# Patient Record
Sex: FEMALE | Race: BLACK OR AFRICAN AMERICAN | NOT HISPANIC OR LATINO | Employment: FULL TIME | URBAN - METROPOLITAN AREA
[De-identification: names, ages, dates, MRNs, and addresses within clinical notes are randomized per-mention and may not be internally consistent; named-entity substitution may affect disease eponyms.]

---

## 2024-01-21 ENCOUNTER — APPOINTMENT (OUTPATIENT)
Dept: RADIOLOGY | Facility: HOSPITAL | Age: 34
End: 2024-01-21
Payer: COMMERCIAL

## 2024-01-21 ENCOUNTER — HOSPITAL ENCOUNTER (EMERGENCY)
Facility: HOSPITAL | Age: 34
Discharge: HOME | End: 2024-01-21
Payer: COMMERCIAL

## 2024-01-21 VITALS
OXYGEN SATURATION: 99 % | RESPIRATION RATE: 16 BRPM | HEART RATE: 96 BPM | SYSTOLIC BLOOD PRESSURE: 132 MMHG | BODY MASS INDEX: 38.51 KG/M2 | DIASTOLIC BLOOD PRESSURE: 86 MMHG | HEIGHT: 69 IN | TEMPERATURE: 98.6 F | WEIGHT: 260 LBS

## 2024-01-21 DIAGNOSIS — S06.0X0A CONCUSSION WITHOUT LOSS OF CONSCIOUSNESS, INITIAL ENCOUNTER: Primary | ICD-10-CM
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
## 2024-01-21 PROCEDURE — 99284 EMERGENCY DEPT VISIT MOD MDM: CPT | Performed by: PHYSICIAN ASSISTANT

## 2024-01-21 PROCEDURE — 99284 EMERGENCY DEPT VISIT MOD MDM: CPT

## 2024-01-21 PROCEDURE — 70450 CT HEAD/BRAIN W/O DYE: CPT | Performed by: STUDENT IN AN ORGANIZED HEALTH CARE EDUCATION/TRAINING PROGRAM

## 2024-01-21 PROCEDURE — 70450 CT HEAD/BRAIN W/O DYE: CPT

## 2024-01-21 PROCEDURE — 2500000005 HC RX 250 GENERAL PHARMACY W/O HCPCS: Performed by: PHYSICIAN ASSISTANT

## 2024-01-21 RX ORDER — ONDANSETRON 4 MG/1
4 TABLET, ORALLY DISINTEGRATING ORAL ONCE
Status: COMPLETED | OUTPATIENT
Start: 2024-01-21 | End: 2024-01-21

## 2024-01-21 RX ORDER — METHOCARBAMOL 750 MG/1
1500 TABLET, FILM COATED ORAL 3 TIMES DAILY PRN
Qty: 21 TABLET | Refills: 0 | Status: SHIPPED | OUTPATIENT
Start: 2024-01-21

## 2024-01-21 RX ORDER — ONDANSETRON 4 MG/1
4 TABLET, ORALLY DISINTEGRATING ORAL EVERY 8 HOURS PRN
Qty: 20 TABLET | Refills: 0 | Status: SHIPPED | OUTPATIENT
Start: 2024-01-21 | End: 2024-01-28

## 2024-01-21 RX ORDER — ACETAMINOPHEN 325 MG/1
975 TABLET ORAL ONCE
Status: COMPLETED | OUTPATIENT
Start: 2024-01-21 | End: 2024-01-21

## 2024-01-21 RX ORDER — ACETAMINOPHEN 500 MG
500 TABLET ORAL EVERY 6 HOURS PRN
Qty: 30 TABLET | Refills: 0 | Status: SHIPPED | OUTPATIENT
Start: 2024-01-21

## 2024-01-21 RX ORDER — IBUPROFEN 600 MG/1
600 TABLET ORAL EVERY 6 HOURS PRN
Qty: 30 TABLET | Refills: 0 | Status: SHIPPED | OUTPATIENT
Start: 2024-01-21

## 2024-01-21 RX ADMIN — ONDANSETRON 4 MG: 4 TABLET, ORALLY DISINTEGRATING ORAL at 17:50

## 2024-01-21 RX ADMIN — ACETAMINOPHEN 975 MG: 325 TABLET ORAL at 17:50

## 2024-01-21 ASSESSMENT — COLUMBIA-SUICIDE SEVERITY RATING SCALE - C-SSRS
2. HAVE YOU ACTUALLY HAD ANY THOUGHTS OF KILLING YOURSELF?: NO
1. IN THE PAST MONTH, HAVE YOU WISHED YOU WERE DEAD OR WISHED YOU COULD GO TO SLEEP AND NOT WAKE UP?: NO
6. HAVE YOU EVER DONE ANYTHING, STARTED TO DO ANYTHING, OR PREPARED TO DO ANYTHING TO END YOUR LIFE?: NO
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
## 2024-01-21 NOTE — DISCHARGE INSTRUCTIONS
Please follow-up with the concussion clinic.  You should hear from them in the next 2 days.  If you do not get a call, please call [phone] to schedule the appointment.

## 2024-06-27 ENCOUNTER — HOSPITAL ENCOUNTER (EMERGENCY)
Facility: HOSPITAL | Age: 34
Discharge: HOME | End: 2024-06-27
Payer: COMMERCIAL

## 2024-06-27 VITALS
BODY MASS INDEX: 40.01 KG/M2 | OXYGEN SATURATION: 100 % | TEMPERATURE: 97.2 F | HEART RATE: 84 BPM | SYSTOLIC BLOOD PRESSURE: 133 MMHG | DIASTOLIC BLOOD PRESSURE: 82 MMHG | HEIGHT: 68 IN | RESPIRATION RATE: 16 BRPM | WEIGHT: 264 LBS

## 2024-06-27 DIAGNOSIS — N93.9 ABNORMAL UTERINE BLEEDING (AUB): Primary | ICD-10-CM

## 2024-06-27 LAB
ABO GROUP (TYPE) IN BLOOD: NORMAL
ANION GAP SERPL CALC-SCNC: 12 MMOL/L (ref 10–20)
ANTIBODY SCREEN: NORMAL
APPEARANCE UR: CLEAR
BASOPHILS # BLD AUTO: 0.08 X10*3/UL (ref 0–0.1)
BASOPHILS NFR BLD AUTO: 1.1 %
BILIRUB UR STRIP.AUTO-MCNC: NEGATIVE MG/DL
BUN SERPL-MCNC: 9 MG/DL (ref 6–23)
CALCIUM SERPL-MCNC: 9.1 MG/DL (ref 8.6–10.6)
CHLORIDE SERPL-SCNC: 102 MMOL/L (ref 98–107)
CO2 SERPL-SCNC: 26 MMOL/L (ref 21–32)
COLOR UR: COLORLESS
CREAT SERPL-MCNC: 0.72 MG/DL (ref 0.5–1.05)
EGFRCR SERPLBLD CKD-EPI 2021: >90 ML/MIN/1.73M*2
EOSINOPHIL # BLD AUTO: 0.06 X10*3/UL (ref 0–0.7)
EOSINOPHIL NFR BLD AUTO: 0.8 %
ERYTHROCYTE [DISTWIDTH] IN BLOOD BY AUTOMATED COUNT: 15.2 % (ref 11.5–14.5)
GLUCOSE SERPL-MCNC: 74 MG/DL (ref 74–99)
GLUCOSE UR STRIP.AUTO-MCNC: NORMAL MG/DL
HCT VFR BLD AUTO: 36.6 % (ref 36–46)
HGB BLD-MCNC: 11.9 G/DL (ref 12–16)
IMM GRANULOCYTES # BLD AUTO: 0.01 X10*3/UL (ref 0–0.7)
IMM GRANULOCYTES NFR BLD AUTO: 0.1 % (ref 0–0.9)
INR PPP: 1 (ref 0.9–1.1)
KETONES UR STRIP.AUTO-MCNC: NEGATIVE MG/DL
LEUKOCYTE ESTERASE UR QL STRIP.AUTO: NEGATIVE
LYMPHOCYTES # BLD AUTO: 2.6 X10*3/UL (ref 1.2–4.8)
LYMPHOCYTES NFR BLD AUTO: 35.3 %
MCH RBC QN AUTO: 27.4 PG (ref 26–34)
MCHC RBC AUTO-ENTMCNC: 32.5 G/DL (ref 32–36)
MCV RBC AUTO: 84 FL (ref 80–100)
MONOCYTES # BLD AUTO: 0.33 X10*3/UL (ref 0.1–1)
MONOCYTES NFR BLD AUTO: 4.5 %
NEUTROPHILS # BLD AUTO: 4.29 X10*3/UL (ref 1.2–7.7)
NEUTROPHILS NFR BLD AUTO: 58.2 %
NITRITE UR QL STRIP.AUTO: NEGATIVE
NRBC BLD-RTO: 0 /100 WBCS (ref 0–0)
PH UR STRIP.AUTO: 6.5 [PH]
PLATELET # BLD AUTO: 480 X10*3/UL (ref 150–450)
POTASSIUM SERPL-SCNC: 3.6 MMOL/L (ref 3.5–5.3)
PREGNANCY TEST URINE, POC: NEGATIVE
PROT UR STRIP.AUTO-MCNC: NEGATIVE MG/DL
PROTHROMBIN TIME: 11.6 SECONDS (ref 9.8–12.8)
RBC # BLD AUTO: 4.34 X10*6/UL (ref 4–5.2)
RBC # UR STRIP.AUTO: ABNORMAL /UL
RBC #/AREA URNS AUTO: NORMAL /HPF
RH FACTOR (ANTIGEN D): NORMAL
SODIUM SERPL-SCNC: 136 MMOL/L (ref 136–145)
SP GR UR STRIP.AUTO: 1
SQUAMOUS #/AREA URNS AUTO: NORMAL /HPF
UROBILINOGEN UR STRIP.AUTO-MCNC: NORMAL MG/DL
WBC # BLD AUTO: 7.4 X10*3/UL (ref 4.4–11.3)
WBC #/AREA URNS AUTO: NORMAL /HPF

## 2024-06-27 PROCEDURE — 85610 PROTHROMBIN TIME: CPT | Performed by: PHYSICIAN ASSISTANT

## 2024-06-27 PROCEDURE — 81025 URINE PREGNANCY TEST: CPT | Performed by: PHYSICIAN ASSISTANT

## 2024-06-27 PROCEDURE — 99283 EMERGENCY DEPT VISIT LOW MDM: CPT

## 2024-06-27 PROCEDURE — 36415 COLL VENOUS BLD VENIPUNCTURE: CPT | Performed by: PHYSICIAN ASSISTANT

## 2024-06-27 PROCEDURE — 99284 EMERGENCY DEPT VISIT MOD MDM: CPT | Performed by: PHYSICIAN ASSISTANT

## 2024-06-27 PROCEDURE — 81001 URINALYSIS AUTO W/SCOPE: CPT | Performed by: PHYSICIAN ASSISTANT

## 2024-06-27 PROCEDURE — 86850 RBC ANTIBODY SCREEN: CPT | Performed by: PHYSICIAN ASSISTANT

## 2024-06-27 PROCEDURE — 85025 COMPLETE CBC W/AUTO DIFF WBC: CPT | Performed by: PHYSICIAN ASSISTANT

## 2024-06-27 PROCEDURE — 80048 BASIC METABOLIC PNL TOTAL CA: CPT | Performed by: PHYSICIAN ASSISTANT

## 2024-06-27 PROCEDURE — 86901 BLOOD TYPING SEROLOGIC RH(D): CPT | Performed by: PHYSICIAN ASSISTANT

## 2024-06-27 RX ORDER — DROSPIRENONE AND ETHINYL ESTRADIOL 0.03MG-3MG
1 KIT ORAL DAILY
Qty: 28 TABLET | Refills: 0 | Status: SHIPPED | OUTPATIENT
Start: 2024-06-27 | End: 2025-06-27

## 2024-06-27 ASSESSMENT — LIFESTYLE VARIABLES
HAVE YOU EVER FELT YOU SHOULD CUT DOWN ON YOUR DRINKING: NO
TOTAL SCORE: 0
EVER FELT BAD OR GUILTY ABOUT YOUR DRINKING: NO
EVER HAD A DRINK FIRST THING IN THE MORNING TO STEADY YOUR NERVES TO GET RID OF A HANGOVER: NO
HAVE PEOPLE ANNOYED YOU BY CRITICIZING YOUR DRINKING: NO

## 2024-06-27 ASSESSMENT — COLUMBIA-SUICIDE SEVERITY RATING SCALE - C-SSRS
6. HAVE YOU EVER DONE ANYTHING, STARTED TO DO ANYTHING, OR PREPARED TO DO ANYTHING TO END YOUR LIFE?: NO
1. IN THE PAST MONTH, HAVE YOU WISHED YOU WERE DEAD OR WISHED YOU COULD GO TO SLEEP AND NOT WAKE UP?: NO
2. HAVE YOU ACTUALLY HAD ANY THOUGHTS OF KILLING YOURSELF?: NO

## 2024-06-27 ASSESSMENT — PAIN DESCRIPTION - LOCATION: LOCATION: PELVIS

## 2024-06-27 ASSESSMENT — PAIN SCALES - GENERAL: PAINLEVEL_OUTOF10: 5 - MODERATE PAIN

## 2024-06-27 ASSESSMENT — PAIN - FUNCTIONAL ASSESSMENT: PAIN_FUNCTIONAL_ASSESSMENT: 0-10

## 2024-06-27 NOTE — DISCHARGE INSTRUCTIONS
Start the birth control pill and take it once daily as instructed in the packet.  A referral is entered to follow-up with OB/GYN, if you have not heard from them call [phone].  Return for any hemorrhaging including soaking pads every 1/2 hour for 3 hours straight or passing out

## 2024-06-27 NOTE — ED PROVIDER NOTES
"HPI:  34-year-old female with history of bipolar disorder, anxiety presents for vaginal bleeding.  States that her cycle started scheduled beginning of June however has persisted since then.  States she is bleeding daily now for over 3 weeks.  States she is soaking through tampons changing over 10 a day.  States that it is not increasing however not improving.  Is noticed nickel size clots.  States she feels mildly lightheaded.  Denies any palpitations syncope or near syncope.  Has not been to OB in \"quite a while\", is supposed to go in July however no appointment active in EMR.  Denies any abdominal pain, states she is not sexually active, is not concerned for STDs.    Physical Exam:   GEN: Vitals noted. NAD  EYES:  EOMs grossly intact, anicteric sclera  ANTONIO: Mucosa moist.  NECK: Supple.  CARD: RRR  PULMONARY: Moving air well. Clear all lung fields.  ABDOMEN: Soft, no guarding, no rigidity. Nontender. NABS  EXTREMITIES: Full ROM, no pitting edema,   SKIN: Intact, warm and dry  NEURO: Alert and oriented x 3, speech is clear, no obvious deficits noted.   : With female nurse as chaperone and assistant; Speculum exam shows no discharge, ulcerations, lesions.  Mild amount of dark red blood in the vaginal vault with small clots present.  Closed cervical os. Bimanual exam shows no adnexal tenderness or mass. No cervical motion tenderness.    ----------------------------------------------------------------------------------------------------------------------------    MDM:  34-year-old female presenting for vaginal bleeding.  On exam she is well-appearing ambulating comfortably.  Vital signs stable.  ABD soft NTTP with NABS.  Pelvic exam performed with female nurse chaperone showed mild amount of dark red blood in the vaginal with small clots present, Clos cervical os, no adnexal or cervical motion tenderness.  Will check laboratory work to evaluate for anemia.  I discussed the option of starting OCPs until she is able " to follow-up with OB/GYN.  She is amenable to this plan.    ED Course as of 06/27/24 1857   Thu Jun 27, 2024 1810 Preg Test, Ur: Negative [ELENA]   1810 WBC, Urine: NONE [ELENA]   1855 HEMOGLOBIN(!): 11.9  Reassuring with no significant anemia [ELENA]   1856 Results were discussed with the patient including discharge plan.  Plan will be to start new OCP, prescription sent to her pharmacy.  She is to follow-up with OB/GYN.  Referral is entered to try to see them soon.  She is provided the phone number.  She is to return for any heavy bleeding including soaking a pad every 1/2 hour for 3 hours straight or signs or symptoms of acute anemia.  She verbalized understanding discharge plan she agreed with plan all questions were answered. [ELENA]      ED Course User Index  [ELENA] Jonah Mcclendon PA-C         Diagnoses as of 06/27/24 1857   Abnormal uterine bleeding (AUB)     No orders to display     Labs Reviewed   CBC WITH AUTO DIFFERENTIAL - Abnormal       Result Value    WBC 7.4      nRBC 0.0      RBC 4.34      Hemoglobin 11.9 (*)     Hematocrit 36.6      MCV 84      MCH 27.4      MCHC 32.5      RDW 15.2 (*)     Platelets 480 (*)     Neutrophils % 58.2      Immature Granulocytes %, Automated 0.1      Lymphocytes % 35.3      Monocytes % 4.5      Eosinophils % 0.8      Basophils % 1.1      Neutrophils Absolute 4.29      Immature Granulocytes Absolute, Automated 0.01      Lymphocytes Absolute 2.60      Monocytes Absolute 0.33      Eosinophils Absolute 0.06      Basophils Absolute 0.08     URINALYSIS WITH REFLEX CULTURE AND MICROSCOPIC - Abnormal    Color, Urine Colorless (*)     Appearance, Urine Clear      Specific Gravity, Urine 1.004 (*)     pH, Urine 6.5      Protein, Urine NEGATIVE      Glucose, Urine Normal      Blood, Urine 0.2 (2+) (*)     Ketones, Urine NEGATIVE      Bilirubin, Urine NEGATIVE      Urobilinogen, Urine Normal      Nitrite, Urine NEGATIVE      Leukocyte Esterase, Urine NEGATIVE     BASIC METABOLIC PANEL -  Normal    Glucose 74      Sodium 136      Potassium 3.6      Chloride 102      Bicarbonate 26      Anion Gap 12      Urea Nitrogen 9      Creatinine 0.72      eGFR >90      Calcium 9.1     PROTIME-INR - Normal    Protime 11.6      INR 1.0     POCT PREGNANCY, URINE - Normal    Preg Test, Ur Negative     TYPE AND SCREEN   URINALYSIS WITH REFLEX CULTURE AND MICROSCOPIC    Narrative:     The following orders were created for panel order Urinalysis with Reflex Culture and Microscopic.  Procedure                               Abnormality         Status                     ---------                               -----------         ------                     Urinalysis with Reflex C...[[id number]]  Abnormal            Final result               Extra Urine Gray Tube[[id number]]                            In process                   Please view results for these tests on the individual orders.   EXTRA URINE GRAY TUBE   URINALYSIS MICROSCOPIC WITH REFLEX CULTURE    WBC, Urine NONE      RBC, Urine 1-2      Squamous Epithelial Cells, Urine 1-9 (SPARSE)         ----------------------------------------------------------------------------------------------------------------------------    This note was dictated using a speech recognition program.  While an attempt was made at proof reading to minimize errors, minor errors in transcription may be present call for questions.     Jonah Mcclendon PA-C  06/27/24 4570

## 2024-06-27 NOTE — ED TRIAGE NOTES
PT started cycle on 6/5 still having bleeding, having some lightheadedness and decreased appetite

## 2024-06-28 ENCOUNTER — TELEPHONE (OUTPATIENT)
Dept: OBSTETRICS AND GYNECOLOGY | Facility: HOSPITAL | Age: 34
End: 2024-06-28
Payer: COMMERCIAL

## 2024-06-28 DIAGNOSIS — N93.8 DUB (DYSFUNCTIONAL UTERINE BLEEDING): Primary | ICD-10-CM

## 2024-06-28 LAB — HOLD SPECIMEN: NORMAL

## 2024-06-28 NOTE — TELEPHONE ENCOUNTER
Per Dr Hawk, pls pend the gyn ultrasound order to him and he will sign. Pt will need to have done today for results to be reviewed at her appt Tues so depending if she can get it done today or not, she may need rescheduled for later in the week w/ Carine    Thank you   Trisha Milan

## 2024-06-28 NOTE — TELEPHONE ENCOUNTER
Patient has a STAT referral placed in for abnormal heavy bleeding since 6/5. Sh was place on Tuesdays scheduled. No Ultrasound was done on patient in ED.  Would want patient to go for Ultrasound before appointment?   Please Advise

## 2024-07-01 ENCOUNTER — HOSPITAL ENCOUNTER (OUTPATIENT)
Dept: RADIOLOGY | Facility: HOSPITAL | Age: 34
Discharge: HOME | End: 2024-07-01
Payer: COMMERCIAL

## 2024-07-01 DIAGNOSIS — N93.8 DUB (DYSFUNCTIONAL UTERINE BLEEDING): ICD-10-CM

## 2024-07-01 PROCEDURE — 76830 TRANSVAGINAL US NON-OB: CPT | Performed by: RADIOLOGY

## 2024-07-01 PROCEDURE — 76856 US EXAM PELVIC COMPLETE: CPT | Performed by: RADIOLOGY

## 2024-07-01 PROCEDURE — 76856 US EXAM PELVIC COMPLETE: CPT

## 2024-07-02 ENCOUNTER — APPOINTMENT (OUTPATIENT)
Dept: OBSTETRICS AND GYNECOLOGY | Facility: CLINIC | Age: 34
End: 2024-07-02
Payer: COMMERCIAL

## 2024-07-02 VITALS
BODY MASS INDEX: 40.16 KG/M2 | WEIGHT: 265 LBS | DIASTOLIC BLOOD PRESSURE: 84 MMHG | SYSTOLIC BLOOD PRESSURE: 118 MMHG | HEIGHT: 68 IN

## 2024-07-02 DIAGNOSIS — N93.8 DUB (DYSFUNCTIONAL UTERINE BLEEDING): Primary | ICD-10-CM

## 2024-07-02 PROCEDURE — 99214 OFFICE O/P EST MOD 30 MIN: CPT | Performed by: OBSTETRICS & GYNECOLOGY

## 2024-07-02 PROCEDURE — 1036F TOBACCO NON-USER: CPT | Performed by: OBSTETRICS & GYNECOLOGY

## 2024-07-02 RX ORDER — LAMOTRIGINE 200 MG/1
200 TABLET ORAL
COMMUNITY
Start: 2024-05-29

## 2024-07-02 RX ORDER — MEGESTROL ACETATE 20 MG/1
20 TABLET ORAL DAILY
Qty: 30 TABLET | Refills: 0 | Status: SHIPPED | OUTPATIENT
Start: 2024-07-02 | End: 2024-08-01

## 2024-07-02 NOTE — LETTER
July 2, 2024     Jonah Mcclendon PA-C  [id number]  Department Of Emergency Medicine  [facility] [address]    Patient: Chante Del Cid   YOB: 1990   Date of Visit: 7/2/2024       Dear Dr. Jonah Mcclendon PA-C:    Thank you for referring Chante Del Cid to me for evaluation. Below are my notes for this consultation.  If you have questions, please do not hesitate to call me. I look forward to following your patient along with you.       Sincerely,     Dawson Hawk, DO      CC: Andie Reis, DO  ______________________________________________________________________________________    qChante Del Cid is a 34 y.o. female who presents with a chief complaint of Follow-up (Referred for irreg vaginal bleeding, patient had ultrasound done 07/01/2024)      SUBJECTIVE  Patient presents as referral from Dr. Mcclendon for dysfunctional bleeding.  She states she is bleeding about 3 weeks continuously.  She was started on birth control which has not stopped her bleeding.  She had an ultrasound that was normal.  She never had this problem before and nothing seems to be making it any better.  We discussed this at length and some other birth control options    Past Medical History:   Diagnosis Date   • Anxiety    • Bipolar 2 disorder (Multi)    • Other specified health status     No pertinent past medical history     Past Surgical History:   Procedure Laterality Date   • OTHER SURGICAL HISTORY  10/26/2016    Prior Surgical Procedure Not Done     Social History     Socioeconomic History   • Marital status: Single     Spouse name: None   • Number of children: None   • Years of education: None   • Highest education level: None   Occupational History   • None   Tobacco Use   • Smoking status: Never   • Smokeless tobacco: Never   Vaping Use   • Vaping status: Never Used   Substance and Sexual Activity   • Alcohol use: Not Currently   • Drug use: Never   • Sexual activity: Not Currently   Other Topics Concern    • None   Social History Narrative   • None     Social Determinants of Health     Financial Resource Strain: Not at Risk (2023)    Received from [employer]     Financial Resource Strain    • Financial Resource Strain: 1   Food Insecurity: Not at Risk (2023)    Received from [employer]     Food Insecurity    • Food: 1   Transportation Needs: Not at Risk (2023)    Received from [employer]     Transportation Needs    • Transportation: 1   Physical Activity: At Risk (2023)    Received from [employer]     Physical Activity    • Physical Activity: 2   Stress: Not at Risk (2023)    Received from [facility]     Stress    • Stress: 1   Social Connections: Not at Risk (2023)    Received from [employer]     Social Connections    • Social Connections and Isolation: 1   Intimate Partner Violence: Not on file   Housing Stability: Not at Risk (2023)    Received from [employer]     Housing Stability    • Housin     No family history on file.    OB History    Para Term  AB Living   1 1 1 0 0 1   SAB IAB Ectopic Multiple Live Births   0 0 0 0 1      # Outcome Date GA Lbr Bhaskar/2nd Weight Sex Delivery Anes PTL Lv   1 Term  40w0d  3.771 kg F Vag-Spont EPI  CRISTOBAL       OBJECTIVE  Allergies   Allergen Reactions   • Bee Venom Protein (Honey Bee) Hives and Swelling      (Not in a hospital admission)       Review of Systems  History obtained from the patient  General ROS: negative  Psychological ROS: negative  Gastrointestinal ROS: negative  Musculoskeletal ROS: negative  Physical Exam  General Appearance: awake, alert, oriented, in no acute distress, well developed, well nourished, and in no acute distress  Skin: there are no suspicious lesions or rashes of concern, skin color, texture, turgor are normal; there are no bruises, rashes or lesions.  Head/Face: NCAT  Eyes: No gross abnormalities., PERRL, and EOMI  Neck: neck- supple, no mass, non-tender  Back: no pain to palpation  Abdomen: Soft, non-tender, normal bowel sounds;  "no bruits, organomegaly or masses.  Extremities: Extremities warm to touch, pink, with no edema.  Musculoskeletal: negative    /84   Ht 1.727 m (5' 8\")   Wt 120 kg (265 lb)   LMP 06/05/2024 (Exact Date) Comment: current  BMI 40.29 kg/m²    Problem List Items Addressed This Visit    None  Visit Diagnoses       DUB (dysfunctional uterine bleeding)    -  Primary    Relevant Medications    megestrol (Megace) 20 mg tablet         Follow up 2 weeks for Mirena iud   Given pt handout      "

## 2024-07-02 NOTE — PROGRESS NOTES
Briseida Del Cid is a 34 y.o. female who presents with a chief complaint of Follow-up (Referred for irreg vaginal bleeding, patient had ultrasound done 07/01/2024)      SUBJECTIVE  Patient presents as referral from Dr. Mcclendon for dysfunctional bleeding.  She states she is bleeding about 3 weeks continuously.  She was started on birth control which has not stopped her bleeding.  She had an ultrasound that was normal.  She never had this problem before and nothing seems to be making it any better.  We discussed this at length and some other birth control options    Past Medical History:   Diagnosis Date    Anxiety     Bipolar 2 disorder (Multi)     Other specified health status     No pertinent past medical history     Past Surgical History:   Procedure Laterality Date    OTHER SURGICAL HISTORY  10/26/2016    Prior Surgical Procedure Not Done     Social History     Socioeconomic History    Marital status: Single     Spouse name: None    Number of children: None    Years of education: None    Highest education level: None   Occupational History    None   Tobacco Use    Smoking status: Never    Smokeless tobacco: Never   Vaping Use    Vaping status: Never Used   Substance and Sexual Activity    Alcohol use: Not Currently    Drug use: Never    Sexual activity: Not Currently   Other Topics Concern    None   Social History Narrative    None     Social Determinants of Health     Financial Resource Strain: Not at Risk (2/22/2023)    Received from [employer]     Financial Resource Strain     Financial Resource Strain: 1   Food Insecurity: Not at Risk (2/22/2023)    Received from [facility]     Food Insecurity     Food: 1   Transportation Needs: Not at Risk (2/22/2023)    Received from [facility]     Transportation Needs     Transportation: 1   Physical Activity: At Risk (2/22/2023)    Received from [facility]     Physical Activity     Physical Activity: 2   Stress: Not at Risk (2/22/2023)    Received from [facility]     Stress     Stress: 1  "  Social Connections: Not at Risk (2023)    Received from [employer] [facility]     Social Connections and Isolation: 1   Intimate Partner Violence: Not on file   Housing Stability: Not at Risk (2023)    Received from [employer]     Housing Stability     Housin     No family history on file.    OB History    Para Term  AB Living   1 1 1 0 0 1   SAB IAB Ectopic Multiple Live Births   0 0 0 0 1      # Outcome Date GA Lbr Bhaskar/2nd Weight Sex Delivery Anes PTL Lv   1 Term  40w0d  3.771 kg F Vag-Spont EPI  CRISTOBAL       OBJECTIVE  Allergies   Allergen Reactions    Bee Venom Protein (Honey Bee) Hives and Swelling      (Not in a hospital admission)       Review of Systems  History obtained from the patient  General ROS: negative  Psychological ROS: negative  Gastrointestinal ROS: negative  Musculoskeletal ROS: negative  Physical Exam  General Appearance: awake, alert, oriented, in no acute distress, well developed, well nourished, and in no acute distress  Skin: there are no suspicious lesions or rashes of concern, skin color, texture, turgor are normal; there are no bruises, rashes or lesions.  Head/Face: NCAT  Eyes: No gross abnormalities., PERRL, and EOMI  Neck: neck- supple, no mass, non-tender  Back: no pain to palpation  Abdomen: Soft, non-tender, normal bowel sounds; no bruits, organomegaly or masses.  Extremities: Extremities warm to touch, pink, with no edema.  Musculoskeletal: negative    /84   Ht 1.727 m (5' 8\")   Wt 120 kg (265 lb)   LMP 2024 (Exact Date) Comment: current  BMI 40.29 kg/m²    Problem List Items Addressed This Visit    None  Visit Diagnoses       DUB (dysfunctional uterine bleeding)    -  Primary    Relevant Medications    megestrol (Megace) 20 mg tablet         Follow up 2 weeks for Mirena iud   Given pt handout      "

## 2024-07-09 ENCOUNTER — TELEPHONE (OUTPATIENT)
Dept: OBSTETRICS AND GYNECOLOGY | Facility: CLINIC | Age: 34
End: 2024-07-09
Payer: COMMERCIAL

## 2024-07-09 NOTE — TELEPHONE ENCOUNTER
"Called patient and told her providers recommendations \"increase her megace to 2per day\"  and patient now concerned about \"major side effects\"    English Cope    "

## 2024-07-09 NOTE — TELEPHONE ENCOUNTER
"Patient states that she is concerned she is still bleeding. She has been taking the medication Dr. Hawk prescribed for 7 days with no results. Patient states that during the day while at work being on her feet she goes through 2 pads an hour but toward the end of the day it slows down. She wanted to know about a procedure she read about for finding endometriosis. I advised the patient that she should go to the ER but patient verbalized \"I'm fine, I dont need to go\". Patient also worried about costs since she no longer has insurance.     English Anatoliy    "

## 2024-07-16 ENCOUNTER — APPOINTMENT (OUTPATIENT)
Dept: OBSTETRICS AND GYNECOLOGY | Facility: CLINIC | Age: 34
End: 2024-07-16
Payer: COMMERCIAL

## 2024-07-16 VITALS — WEIGHT: 268 LBS | BODY MASS INDEX: 40.62 KG/M2 | HEIGHT: 68 IN

## 2024-07-16 DIAGNOSIS — N93.8 DUB (DYSFUNCTIONAL UTERINE BLEEDING): Primary | ICD-10-CM

## 2024-07-16 DIAGNOSIS — L73.2 HIDRADENITIS SUPPURATIVA: ICD-10-CM

## 2024-07-16 PROCEDURE — 99214 OFFICE O/P EST MOD 30 MIN: CPT | Performed by: OBSTETRICS & GYNECOLOGY

## 2024-07-16 PROCEDURE — 1036F TOBACCO NON-USER: CPT | Performed by: OBSTETRICS & GYNECOLOGY

## 2024-07-16 RX ORDER — NORGESTIMATE AND ETHINYL ESTRADIOL 0.25-0.035
1 KIT ORAL DAILY
Qty: 28 TABLET | Refills: 11 | Status: SHIPPED | OUTPATIENT
Start: 2024-07-16 | End: 2025-07-16

## 2024-07-16 RX ORDER — CEFADROXIL 500 MG/1
500 CAPSULE ORAL 2 TIMES DAILY
Qty: 20 CAPSULE | Refills: 0 | Status: SHIPPED | OUTPATIENT
Start: 2024-07-16 | End: 2024-07-26

## 2024-07-16 NOTE — PROGRESS NOTES
Chante Del Cid is a 34 y.o. female who presents with a chief complaint of Contraception (Would like to go on birth control pills instead of the IUD )      SUBJECTIVE  Patient presents wanting to get treatment for dysfunctional bleeding.  She was going to get a Mirena IUD but decided against it and wants to be put on birth control pills.  She understands the importance of taking them on time and on a regular basis.  She is also having problems with a axillary boil.  She also gets them in her groin and under her breast.  We talked about hidradenitis and she is seeking treatment for that.    Past Medical History:   Diagnosis Date    Anxiety     Bipolar 2 disorder (Multi)     Other specified health status     No pertinent past medical history     Past Surgical History:   Procedure Laterality Date    OTHER SURGICAL HISTORY  10/26/2016    Prior Surgical Procedure Not Done     Social History     Socioeconomic History    Marital status: Single   Tobacco Use    Smoking status: Never    Smokeless tobacco: Never   Vaping Use    Vaping status: Never Used   Substance and Sexual Activity    Alcohol use: Not Currently    Drug use: Never    Sexual activity: Not Currently     Social Determinants of Health     Financial Resource Strain: Not at Risk (2/22/2023)    Received from [facility]    Financial Resource Strain     Financial Resource Strain: 1   Food Insecurity: Not at Risk (2/22/2023)    Received from [facility]    Food Insecurity     Food: 1   Transportation Needs: Not at Risk (2/22/2023)    Received from [facility]    Transportation Needs     Transportation: 1   Physical Activity: At Risk (2/22/2023)    Received from [facility]    Physical Activity     Physical Activity: 2   Stress: Not at Risk (2/22/2023)    Received from [facility]    Stress     Stress: 1   Social Connections: Not at Risk (2/22/2023)    Received from [facility]    Social Connections     Social Connections and Isolation: 1   Housing  "Stability: Not at Risk (2023)    Received from [employer]    Housing Stability     Housin     No family history on file.    OB History    Para Term  AB Living   1 1 1 0 0 1   SAB IAB Ectopic Multiple Live Births   0 0 0 0 1      # Outcome Date GA Lbr Bhaskar/2nd Weight Sex Type Anes PTL Lv   1 Term  40w0d  3.771 kg F Vag-Spont EPI  CRISTOBAL       OBJECTIVE  Allergies   Allergen Reactions    Bee Venom Protein (Honey Bee) Hives and Swelling      (Not in a hospital admission)       Review of Systems  History obtained from the patient  General ROS: negative  Psychological ROS: negative  Gastrointestinal ROS: no abdominal pain, change in bowel habits, or black or bloody stools  Musculoskeletal ROS: negative  Physical Exam  General Appearance: awake, alert, oriented, in no acute distress, well developed, well nourished, and in no acute distress  Skin: there is a lesion of hydradenitis in her left axilla  Head/Face: NCAT  Eyes: No gross abnormalities., PERRL, and EOMI  Neck: neck- supple, no mass, non-tender  Back: no pain to palpation  Abdomen: Soft, non-tender, normal bowel sounds; no bruits, organomegaly or masses.  Extremities: Extremities warm to touch, pink, with no edema.  Musculoskeletal: negative    Ht 1.727 m (5' 8\")   Wt 122 kg (268 lb)   LMP 2024 (Exact Date) Comment: just stops 2 days ago from   BMI 40.75 kg/m²    Problem List Items Addressed This Visit    None  Visit Diagnoses       DUB (dysfunctional uterine bleeding)    -  Primary    Relevant Medications    norgestimate-ethinyl estradioL (Ortho-Cyclen) 0.25-35 mg-mcg tablet    Hidradenitis suppurativa        Relevant Medications    cefadroxil (Duricef) 500 mg capsule         Follow up for annual      "

## 2024-07-19 ENCOUNTER — APPOINTMENT (OUTPATIENT)
Dept: OBSTETRICS AND GYNECOLOGY | Facility: CLINIC | Age: 34
End: 2024-07-19
Payer: COMMERCIAL

## 2025-05-23 ENCOUNTER — HOSPITAL ENCOUNTER (EMERGENCY)
Facility: HOSPITAL | Age: 35
Discharge: HOME | End: 2025-05-23

## 2025-05-23 VITALS
RESPIRATION RATE: 18 BRPM | OXYGEN SATURATION: 99 % | TEMPERATURE: 97.5 F | SYSTOLIC BLOOD PRESSURE: 135 MMHG | HEART RATE: 86 BPM | DIASTOLIC BLOOD PRESSURE: 80 MMHG

## 2025-05-23 DIAGNOSIS — R10.9 ABDOMINAL PAIN, UNSPECIFIED ABDOMINAL LOCATION: Primary | ICD-10-CM

## 2025-05-23 LAB
APPEARANCE UR: CLEAR
BILIRUB UR STRIP.AUTO-MCNC: NEGATIVE MG/DL
CLUE CELLS SPEC QL WET PREP: NORMAL
COLOR UR: COLORLESS
GLUCOSE UR STRIP.AUTO-MCNC: NORMAL MG/DL
HOLD SPECIMEN: NORMAL
KETONES UR STRIP.AUTO-MCNC: NEGATIVE MG/DL
LEUKOCYTE ESTERASE UR QL STRIP.AUTO: NEGATIVE
NITRITE UR QL STRIP.AUTO: NEGATIVE
PH UR STRIP.AUTO: 7 [PH]
PREGNANCY TEST URINE, POC: NEGATIVE
PROT UR STRIP.AUTO-MCNC: NEGATIVE MG/DL
RBC # UR STRIP.AUTO: NEGATIVE MG/DL
SP GR UR STRIP.AUTO: 1.01
T VAGINALIS SPEC QL WET PREP: NORMAL
TRICHOMONAS REFLEX COMMENT: NORMAL
UROBILINOGEN UR STRIP.AUTO-MCNC: NORMAL MG/DL
WBC VAG QL WET PREP: NORMAL
YEAST VAG QL WET PREP: NORMAL

## 2025-05-23 PROCEDURE — 87661 TRICHOMONAS VAGINALIS AMPLIF: CPT

## 2025-05-23 PROCEDURE — 99284 EMERGENCY DEPT VISIT MOD MDM: CPT

## 2025-05-23 PROCEDURE — 81003 URINALYSIS AUTO W/O SCOPE: CPT

## 2025-05-23 PROCEDURE — 99283 EMERGENCY DEPT VISIT LOW MDM: CPT

## 2025-05-23 PROCEDURE — 81025 URINE PREGNANCY TEST: CPT

## 2025-05-23 PROCEDURE — 87491 CHLMYD TRACH DNA AMP PROBE: CPT

## 2025-05-23 PROCEDURE — 87210 SMEAR WET MOUNT SALINE/INK: CPT

## 2025-05-23 ASSESSMENT — PAIN SCALES - GENERAL: PAINLEVEL_OUTOF10: 0 - NO PAIN

## 2025-05-23 ASSESSMENT — PAIN - FUNCTIONAL ASSESSMENT: PAIN_FUNCTIONAL_ASSESSMENT: 0-10

## 2025-05-23 NOTE — ED TRIAGE NOTES
Pt presents to ED with concerns for abdominal pain accompanied by irritation. Denies urinary symptoms. Patient describes concerns for possible pregnancy stating that she is experiencing a late cycle, chest soreness, and nausea. Rates pain 3/10.

## 2025-05-23 NOTE — ED PROVIDER NOTES
HPI   Chief Complaint   Patient presents with    Abdominal Pain       35-year-old female PMH HTN, ACTHRYN, ALVERTO presents emergency department chief complaints of vaginal irritation, STD concern.  Patient reports last few days been having some abnormal vaginal discharge, malodorous discharge as well as lower abdominal discomfort.  Also states that she is about 4 days late on her menstrual cycle and is requesting pregnancy test.  Does endorse recent unprotected intercourse.  Denies nausea, vomiting, vaginal bleeding, flulike symptoms, chest pain, shortness of breath.              Patient History   Medical History[1]  Surgical History[2]  Family History[3]  Social History[4]    Physical Exam   ED Triage Vitals [05/23/25 1134]   Temperature Heart Rate Respirations BP   36.4 °C (97.5 °F) 86 18 135/80      Pulse Ox Temp src Heart Rate Source Patient Position   99 % -- -- --      BP Location FiO2 (%)     -- --       Physical Exam  Vitals and nursing note reviewed. Exam conducted with a chaperone present.   Constitutional:       General: She is not in acute distress.     Appearance: Normal appearance. She is normal weight. She is not ill-appearing or toxic-appearing.   HENT:      Head: Normocephalic and atraumatic.   Eyes:      General:         Right eye: No discharge.         Left eye: No discharge.      Extraocular Movements: Extraocular movements intact.      Conjunctiva/sclera: Conjunctivae normal.      Pupils: Pupils are equal, round, and reactive to light.   Cardiovascular:      Rate and Rhythm: Normal rate and regular rhythm.      Heart sounds: Normal heart sounds. No murmur heard.     No friction rub. No gallop.   Pulmonary:      Effort: Pulmonary effort is normal. No respiratory distress.      Breath sounds: Normal breath sounds. No stridor. No wheezing, rhonchi or rales.   Abdominal:      General: Abdomen is flat. There is no distension.      Palpations: Abdomen is soft. There is no mass.      Tenderness: There is no  abdominal tenderness. There is no guarding or rebound.      Hernia: No hernia is present.   Genitourinary:     Comments: Pelvic exam completed.  Chaperone in room.  Normal-appearing external genitalia.  Speculum exam with scant white discharge visualized in vaginal vault.  No blood products visualized in vaginal vault.  Cervical os closed.  Neurological:      Mental Status: She is alert.           ED Course & MDM   Diagnoses as of 05/23/25 1424   Abdominal pain, unspecified abdominal location                 No data recorded     [facility] Coma Scale Score: 15 (05/23/25 1134 : Neeta Turk RN)                           Medical Decision Making  35-year-old female presents emergency department chief complaints of STD concern, pregnancy concern.  Reports some abnormal vaginal discharge, malodorous discharge as well as lower abdominal discomfort for the past few days.  Also stating they are about 4 days late on her menstrual cycle and is requesting pregnancy test.  Denies any urinary symptoms.  Patient has no areas of abdominal tenderness on exam and describes it as a weird discomfort sensation.  Vital signs reassuring here in the emergency department and patient does not appear in acute distress resting comfortably at bedside.  Will obtain pelvic exam as well as urine pregnancy test, UA here and reevaluate.    2:23 PM Workup done here negative for UTI.  Urine pregnancy test negative.  Pelvic swabs negative for STDs.  Did time to find this patient to go over the results here in the ED.  However, unable to locate patient here in the emergency department.  Did attempt to call this patient with phone and they did confirm that they left the emergency department.  Did advise patient of their findings above and they showed understanding of this.  Patient was advised that the rest of their STD testing will come back tomorrow and they will be notified if any pertinent findings are found.  Patient does report that most of their  symptoms have subsided at this time.  Spoke with this patient regarding symptomatic management moving forward as well as signs symptoms of return.  Patient to have opportunity ask questions and other answered.  Patient marked as left with treatment incomplete.        Procedure  Procedures       Jasmeet Lundberg PA-C  05/23/25 1429         [1]   Past Medical History:  Diagnosis Date    Anxiety     Bipolar 2 disorder (Multi)     Other specified health status     No pertinent past medical history   [2]   Past Surgical History:  Procedure Laterality Date    OTHER SURGICAL HISTORY  10/26/2016    Prior Surgical Procedure Not Done   [3] No family history on file.  [4]   Social History  Tobacco Use    Smoking status: Never    Smokeless tobacco: Never   Vaping Use    Vaping status: Never Used   Substance Use Topics    Alcohol use: Not Currently    Drug use: Never        Jasmeet Lundberg PA-C  05/23/25 1425

## 2025-05-24 LAB
C TRACH RRNA SPEC QL NAA+PROBE: NEGATIVE
N GONORRHOEA DNA SPEC QL PROBE+SIG AMP: NEGATIVE
T VAGINALIS RRNA SPEC QL NAA+PROBE: NEGATIVE

## 2025-08-20 ENCOUNTER — HOSPITAL ENCOUNTER (EMERGENCY)
Facility: HOSPITAL | Age: 35
Discharge: HOME | End: 2025-08-20
Payer: COMMERCIAL

## 2025-08-20 VITALS
OXYGEN SATURATION: 100 % | SYSTOLIC BLOOD PRESSURE: 123 MMHG | HEIGHT: 72 IN | DIASTOLIC BLOOD PRESSURE: 79 MMHG | WEIGHT: 268 LBS | HEART RATE: 77 BPM | BODY MASS INDEX: 36.3 KG/M2 | TEMPERATURE: 97.5 F | RESPIRATION RATE: 18 BRPM

## 2025-08-20 DIAGNOSIS — G47.8 AWAKENS FROM SLEEP AT NIGHT: ICD-10-CM

## 2025-08-20 DIAGNOSIS — N93.9 ABNORMAL UTERINE BLEEDING (AUB): Primary | ICD-10-CM

## 2025-08-20 LAB
ANION GAP SERPL CALC-SCNC: 11 MMOL/L (ref 10–20)
BASOPHILS # BLD AUTO: 0.06 X10*3/UL (ref 0–0.1)
BASOPHILS NFR BLD AUTO: 1.1 %
BUN SERPL-MCNC: 10 MG/DL (ref 6–23)
CALCIUM SERPL-MCNC: 9.5 MG/DL (ref 8.6–10.6)
CHLORIDE SERPL-SCNC: 103 MMOL/L (ref 98–107)
CO2 SERPL-SCNC: 28 MMOL/L (ref 21–32)
CREAT SERPL-MCNC: 0.61 MG/DL (ref 0.5–1.05)
EGFRCR SERPLBLD CKD-EPI 2021: >90 ML/MIN/1.73M*2
EOSINOPHIL # BLD AUTO: 0.06 X10*3/UL (ref 0–0.7)
EOSINOPHIL NFR BLD AUTO: 1.1 %
ERYTHROCYTE [DISTWIDTH] IN BLOOD BY AUTOMATED COUNT: 16 % (ref 11.5–14.5)
GLUCOSE SERPL-MCNC: 83 MG/DL (ref 74–99)
HCT VFR BLD AUTO: 38.9 % (ref 36–46)
HGB BLD-MCNC: 12.5 G/DL (ref 12–16)
IMM GRANULOCYTES # BLD AUTO: 0.01 X10*3/UL (ref 0–0.7)
IMM GRANULOCYTES NFR BLD AUTO: 0.2 % (ref 0–0.9)
LYMPHOCYTES # BLD AUTO: 1.89 X10*3/UL (ref 1.2–4.8)
LYMPHOCYTES NFR BLD AUTO: 35.9 %
MCH RBC QN AUTO: 27 PG (ref 26–34)
MCHC RBC AUTO-ENTMCNC: 32.1 G/DL (ref 32–36)
MCV RBC AUTO: 84 FL (ref 80–100)
MONOCYTES # BLD AUTO: 0.36 X10*3/UL (ref 0.1–1)
MONOCYTES NFR BLD AUTO: 6.8 %
NEUTROPHILS # BLD AUTO: 2.88 X10*3/UL (ref 1.2–7.7)
NEUTROPHILS NFR BLD AUTO: 54.9 %
NRBC BLD-RTO: 0 /100 WBCS (ref 0–0)
PLATELET # BLD AUTO: 418 X10*3/UL (ref 150–450)
POTASSIUM SERPL-SCNC: 4.4 MMOL/L (ref 3.5–5.3)
PREGNANCY TEST URINE, POC: NEGATIVE
RBC # BLD AUTO: 4.63 X10*6/UL (ref 4–5.2)
SODIUM SERPL-SCNC: 138 MMOL/L (ref 136–145)
TSH SERPL-ACNC: 1.18 MIU/L (ref 0.44–3.98)
WBC # BLD AUTO: 5.3 X10*3/UL (ref 4.4–11.3)

## 2025-08-20 PROCEDURE — 80048 BASIC METABOLIC PNL TOTAL CA: CPT

## 2025-08-20 PROCEDURE — 85025 COMPLETE CBC W/AUTO DIFF WBC: CPT

## 2025-08-20 PROCEDURE — 99283 EMERGENCY DEPT VISIT LOW MDM: CPT

## 2025-08-20 PROCEDURE — 81025 URINE PREGNANCY TEST: CPT

## 2025-08-20 PROCEDURE — 36415 COLL VENOUS BLD VENIPUNCTURE: CPT

## 2025-08-20 PROCEDURE — 84443 ASSAY THYROID STIM HORMONE: CPT

## 2025-08-20 PROCEDURE — 99284 EMERGENCY DEPT VISIT MOD MDM: CPT

## 2025-08-20 ASSESSMENT — PAIN SCALES - GENERAL: PAINLEVEL_OUTOF10: 0 - NO PAIN

## 2025-08-20 ASSESSMENT — LIFESTYLE VARIABLES
EVER FELT BAD OR GUILTY ABOUT YOUR DRINKING: NO
EVER HAD A DRINK FIRST THING IN THE MORNING TO STEADY YOUR NERVES TO GET RID OF A HANGOVER: NO
HAVE PEOPLE ANNOYED YOU BY CRITICIZING YOUR DRINKING: NO
TOTAL SCORE: 0
HAVE YOU EVER FELT YOU SHOULD CUT DOWN ON YOUR DRINKING: NO

## 2025-08-20 ASSESSMENT — PAIN - FUNCTIONAL ASSESSMENT: PAIN_FUNCTIONAL_ASSESSMENT: 0-10

## 2025-08-21 ENCOUNTER — TELEPHONE (OUTPATIENT)
Dept: PHARMACY | Facility: HOSPITAL | Age: 35
End: 2025-08-21
Payer: COMMERCIAL

## 2025-08-21 LAB
HOLD SPECIMEN: NORMAL
HOLD SPECIMEN: NORMAL

## 2025-10-21 ENCOUNTER — APPOINTMENT (OUTPATIENT)
Dept: PRIMARY CARE | Facility: CLINIC | Age: 35
End: 2025-10-21